# Patient Record
Sex: FEMALE | Race: ASIAN | NOT HISPANIC OR LATINO
[De-identification: names, ages, dates, MRNs, and addresses within clinical notes are randomized per-mention and may not be internally consistent; named-entity substitution may affect disease eponyms.]

---

## 2022-04-05 PROBLEM — Z00.00 ENCOUNTER FOR PREVENTIVE HEALTH EXAMINATION: Status: ACTIVE | Noted: 2022-04-05

## 2022-04-11 ENCOUNTER — APPOINTMENT (OUTPATIENT)
Dept: ALLERGY | Facility: CLINIC | Age: 57
End: 2022-04-11
Payer: COMMERCIAL

## 2022-04-11 ENCOUNTER — NON-APPOINTMENT (OUTPATIENT)
Age: 57
End: 2022-04-11

## 2022-04-11 VITALS
OXYGEN SATURATION: 98 % | TEMPERATURE: 98.1 F | RESPIRATION RATE: 15 BRPM | SYSTOLIC BLOOD PRESSURE: 115 MMHG | DIASTOLIC BLOOD PRESSURE: 65 MMHG | HEART RATE: 64 BPM

## 2022-04-11 PROCEDURE — 99204 OFFICE O/P NEW MOD 45 MIN: CPT | Mod: 25

## 2022-04-11 PROCEDURE — 95004 PERQ TESTS W/ALRGNC XTRCS: CPT

## 2022-04-11 PROCEDURE — 95018 ALL TSTG PERQ&IQ DRUGS/BIOL: CPT

## 2022-04-11 RX ORDER — COVID-19 MOLECULAR TEST ASSAY
KIT MISCELLANEOUS
Qty: 1 | Refills: 0 | Status: DISCONTINUED | COMMUNITY
Start: 2022-04-05

## 2022-04-11 RX ORDER — LEVOTHYROXINE SODIUM 112 UG/1
112 TABLET ORAL
Qty: 90 | Refills: 0 | Status: ACTIVE | COMMUNITY
Start: 2021-09-22

## 2022-04-11 NOTE — PHYSICAL EXAM
[Alert] : alert [Well Nourished] : well nourished [No Acute Distress] : no acute distress [Well Developed] : well developed [Normal Voice/Communication] : normal voice communication [No Neck Mass] : no neck mass was observed [No LAD] : no lymphadenopathy [No Thyroid Mass] : no thyroid mass [Supple] : the neck was supple [Normal Rate and Effort] : normal respiratory rhythm and effort [No Crackles] : no crackles [No Retractions] : no retractions [Wheezing] : no wheezing was heard [Normal Rate] : heart rate was normal  [Normal S1, S2] : normal S1 and S2 [No murmur] : no murmur [Regular Rhythm] : with a regular rhythm [Normal Cervical Lymph Nodes] : cervical [Skin Intact] : skin intact  [No Rash] : no rash [No Cyanosis] : no cyanosis [Normal Mood] : mood was normal [Normal Affect] : affect was normal [Judgment and Insight Age Appropriate] : judgement and insight is age appropriate [Alert, Awake, Oriented as Age-Appropriate] : alert, awake, oriented as age appropriate

## 2022-04-11 NOTE — SOCIAL HISTORY
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

## 2022-04-11 NOTE — ASSESSMENT
[FreeTextEntry1] : Headaches with no underlying food allergies - the foods she mentioned may be more of a food intolerance causing her symptoms. \par \par History of seasonal allergic rhinitis - not problematic at this time\par \par Possible urticaria - patient to send me photo of rash for any recurrence

## 2022-04-11 NOTE — HISTORY OF PRESENT ILLNESS
[Asthma] : asthma [Eczematous rashes] : eczematous rashes [Venom Reactions] : venom reactions [de-identified] : Worsening H/As - saw PCP - patient was concerned about possible food allergies - blood work - told she was allergic to egg white, cow milk and hazelnuts - she feels that her H/A may worsen her headache.\par \par History of seasonal allergies during the spring time - but symptoms have lessened. \par \par Patient born in [facility].   \par \par She reports a random sporadic rash - no symptoms now for the past few months

## 2024-05-08 ENCOUNTER — EMERGENCY (EMERGENCY)
Facility: HOSPITAL | Age: 59
LOS: 1 days | Discharge: HOME | End: 2024-05-08
Attending: EMERGENCY MEDICINE | Admitting: EMERGENCY MEDICINE
Payer: COMMERCIAL

## 2024-05-08 VITALS
TEMPERATURE: 98 F | SYSTOLIC BLOOD PRESSURE: 109 MMHG | RESPIRATION RATE: 17 BRPM | DIASTOLIC BLOOD PRESSURE: 74 MMHG | OXYGEN SATURATION: 100 % | HEART RATE: 62 BPM

## 2024-05-08 VITALS
HEART RATE: 69 BPM | TEMPERATURE: 98 F | DIASTOLIC BLOOD PRESSURE: 72 MMHG | OXYGEN SATURATION: 99 % | RESPIRATION RATE: 16 BRPM | SYSTOLIC BLOOD PRESSURE: 112 MMHG

## 2024-05-08 LAB
ALBUMIN SERPL ELPH-MCNC: 4.4 G/DL — SIGNIFICANT CHANGE UP (ref 3.3–5)
ALP SERPL-CCNC: 78 U/L — SIGNIFICANT CHANGE UP (ref 40–120)
ALT FLD-CCNC: 19 U/L — SIGNIFICANT CHANGE UP (ref 4–33)
ANION GAP SERPL CALC-SCNC: 10 MMOL/L — SIGNIFICANT CHANGE UP (ref 7–14)
AST SERPL-CCNC: 18 U/L — SIGNIFICANT CHANGE UP (ref 4–32)
BASOPHILS # BLD AUTO: 0.05 K/UL — SIGNIFICANT CHANGE UP (ref 0–0.2)
BASOPHILS NFR BLD AUTO: 0.7 % — SIGNIFICANT CHANGE UP (ref 0–2)
BILIRUB SERPL-MCNC: <0.2 MG/DL — SIGNIFICANT CHANGE UP (ref 0.2–1.2)
BUN SERPL-MCNC: 16 MG/DL — SIGNIFICANT CHANGE UP (ref 7–23)
CALCIUM SERPL-MCNC: 9.2 MG/DL — SIGNIFICANT CHANGE UP (ref 8.4–10.5)
CHLORIDE SERPL-SCNC: 104 MMOL/L — SIGNIFICANT CHANGE UP (ref 98–107)
CO2 SERPL-SCNC: 26 MMOL/L — SIGNIFICANT CHANGE UP (ref 22–31)
CREAT SERPL-MCNC: 0.67 MG/DL — SIGNIFICANT CHANGE UP (ref 0.5–1.3)
D DIMER BLD IA.RAPID-MCNC: 731 NG/ML DDU — HIGH
EGFR: 102 ML/MIN/1.73M2 — SIGNIFICANT CHANGE UP
EOSINOPHIL # BLD AUTO: 0.17 K/UL — SIGNIFICANT CHANGE UP (ref 0–0.5)
EOSINOPHIL NFR BLD AUTO: 2.5 % — SIGNIFICANT CHANGE UP (ref 0–6)
GLUCOSE SERPL-MCNC: 93 MG/DL — SIGNIFICANT CHANGE UP (ref 70–99)
HCT VFR BLD CALC: 39.6 % — SIGNIFICANT CHANGE UP (ref 34.5–45)
HGB BLD-MCNC: 12.8 G/DL — SIGNIFICANT CHANGE UP (ref 11.5–15.5)
IANC: 3.96 K/UL — SIGNIFICANT CHANGE UP (ref 1.8–7.4)
IMM GRANULOCYTES NFR BLD AUTO: 0.3 % — SIGNIFICANT CHANGE UP (ref 0–0.9)
LYMPHOCYTES # BLD AUTO: 1.99 K/UL — SIGNIFICANT CHANGE UP (ref 1–3.3)
LYMPHOCYTES # BLD AUTO: 29.1 % — SIGNIFICANT CHANGE UP (ref 13–44)
MAGNESIUM SERPL-MCNC: 2.1 MG/DL — SIGNIFICANT CHANGE UP (ref 1.6–2.6)
MCHC RBC-ENTMCNC: 30 PG — SIGNIFICANT CHANGE UP (ref 27–34)
MCHC RBC-ENTMCNC: 32.3 GM/DL — SIGNIFICANT CHANGE UP (ref 32–36)
MCV RBC AUTO: 92.7 FL — SIGNIFICANT CHANGE UP (ref 80–100)
MONOCYTES # BLD AUTO: 0.65 K/UL — SIGNIFICANT CHANGE UP (ref 0–0.9)
MONOCYTES NFR BLD AUTO: 9.5 % — SIGNIFICANT CHANGE UP (ref 2–14)
NEUTROPHILS # BLD AUTO: 3.96 K/UL — SIGNIFICANT CHANGE UP (ref 1.8–7.4)
NEUTROPHILS NFR BLD AUTO: 57.9 % — SIGNIFICANT CHANGE UP (ref 43–77)
NRBC # BLD: 0 /100 WBCS — SIGNIFICANT CHANGE UP (ref 0–0)
NRBC # FLD: 0 K/UL — SIGNIFICANT CHANGE UP (ref 0–0)
PHOSPHATE SERPL-MCNC: 3.9 MG/DL — SIGNIFICANT CHANGE UP (ref 2.5–4.5)
PLATELET # BLD AUTO: 289 K/UL — SIGNIFICANT CHANGE UP (ref 150–400)
POTASSIUM SERPL-MCNC: 4.3 MMOL/L — SIGNIFICANT CHANGE UP (ref 3.5–5.3)
POTASSIUM SERPL-SCNC: 4.3 MMOL/L — SIGNIFICANT CHANGE UP (ref 3.5–5.3)
PROT SERPL-MCNC: 7.1 G/DL — SIGNIFICANT CHANGE UP (ref 6–8.3)
RBC # BLD: 4.27 M/UL — SIGNIFICANT CHANGE UP (ref 3.8–5.2)
RBC # FLD: 13.3 % — SIGNIFICANT CHANGE UP (ref 10.3–14.5)
SODIUM SERPL-SCNC: 140 MMOL/L — SIGNIFICANT CHANGE UP (ref 135–145)
TROPONIN T, HIGH SENSITIVITY RESULT: <6 NG/L — SIGNIFICANT CHANGE UP
WBC # BLD: 6.84 K/UL — SIGNIFICANT CHANGE UP (ref 3.8–10.5)
WBC # FLD AUTO: 6.84 K/UL — SIGNIFICANT CHANGE UP (ref 3.8–10.5)

## 2024-05-08 PROCEDURE — 93010 ELECTROCARDIOGRAM REPORT: CPT

## 2024-05-08 PROCEDURE — 71046 X-RAY EXAM CHEST 2 VIEWS: CPT | Mod: 26

## 2024-05-08 PROCEDURE — 99285 EMERGENCY DEPT VISIT HI MDM: CPT

## 2024-05-08 PROCEDURE — 93971 EXTREMITY STUDY: CPT | Mod: 26,LT

## 2024-05-08 PROCEDURE — 71275 CT ANGIOGRAPHY CHEST: CPT | Mod: 26,MC

## 2024-05-08 NOTE — ED PROVIDER NOTE - PATIENT PORTAL LINK FT
You can access the FollowMyHealth Patient Portal offered by [facility] by registering at the following website: [email or web address]. By joining [facility]’s FollowMyHealth portal, you will also be able to view your health information using other applications (apps) compatible with our system.

## 2024-05-08 NOTE — ED ADULT NURSE NOTE - CHIEF COMPLAINT QUOTE
states" I am having tingling in my fingers and feet since few weeks and I feel tingling in my left face 10 am today. states she woke up at 830 in the morning and was normal. c/o numbness to both feet and numbness to hands with pain. Dr Zabala called for stroke eval. no code stroke called

## 2024-05-08 NOTE — ED PROVIDER NOTE - PHYSICAL EXAMINATION
CONSTITUTIONAL: Well appearing, awake, alert, and in no apparent distress.  HEAD: normocephalic, atraumatic  ENT: oral mucosa moist  CARDIAC: regular rate and rhythm; no murmurs, rubs, or gallops  RESPIRATORY: normal breath sounds, clear to ascultation bilaterally, no rales, rhonchi, wheezing  GASTROINTESTINAL: abdomen nondistended, soft, nontender, no guarding, rebound tenderness  MSK: Spine appears normal, range of motion is not limited, no muscle or joint tenderness; no LE edema; calf circumference symmetric BL;   NEURO: Alert and oriented, no focal deficits, no motor or sensory deficits. CN2-12 intact, 5/5 strength x 4, symmetric sensation throughout, normal finger to nose BL, normal gait  SKIN: Skin normal color for race, warm, dry and intact. No evidence of rash.  PSYCH: appropriate mood and affect

## 2024-05-08 NOTE — ED ADULT NURSE NOTE - OBJECTIVE STATEMENT
Patient received in intake room 5. Patient A&Ox3 and ambulatory at baseline. Patient presents to the ED c/o bilateral numbness to upper extremities. Patient denies significant phx; family history of pulmonary embolism (patient's mother). Patient states for approximately 3 weeks, she has been experiencing bilateral upper extremity paraesthesia. Pulse/motor/sensory present to all four extremities. Patient denies headache, dizziness, lightheadedness, nausea/vomiting, fever/chills, and pain. Patient offers no complaints at this time. Respirations even and unlabored, no signs/symptoms of acute distress; patient denies dyspnea, shortness of breath, and chest pain; patient able to speak in complete and uninterrupted sentences. 20G IV placed to right AC, labs collected and sent, patient is stable at this time. Steady gait observed.

## 2024-05-08 NOTE — ED PROVIDER NOTE - CARE PLAN
1 Principal Discharge DX:	Paresthesia  Secondary Diagnosis:	Chest discomfort   Finasteride Pregnancy And Lactation Text: This medication is absolutely contraindicated during pregnancy. It is unknown if it is excreted in breast milk.

## 2024-05-08 NOTE — ED PROVIDER NOTE - NSFOLLOWUPINSTRUCTIONS_ED_ALL_ED_FT
You were seen in the Emergency Department for tingling.  Your evaluation today shows your symptoms are not from any dangerous or life-threatening causes.  We recommend you follow up with your PCP within the week.      1) Continue all previously prescribed medications as directed.    2) Follow up with your primary care physician - take copies of your results.    3) Return to the Emergency Department for worsening or persistent symptoms, and/or ANY NEW OR CONCERNING SYMPTOMS.

## 2024-05-08 NOTE — ED PROVIDER NOTE - PROGRESS NOTE DETAILS
Juve PGY1: d-dimer 731.  Will order CTa Juve PGY1: CTa  negative for PE.  Patient updated on results, labs and imaging unremarkable.  Discussed plan for discharge with follow up to pcp.  patient understands and agrees.

## 2024-05-08 NOTE — ED PROVIDER NOTE - OBJECTIVE STATEMENT
58-year-old female, history of hypothyroidism, presented to ED with 2 weeks of intermittent tingling sensation to both her fingers and toes.  No exacerbating relieving factors.  This morning noted tingling sensation to the left side of her face which brought her into the ED today.  No focal weakness, slurred speech.  Patient also reporting midsternal chest heaviness has been intermittent for the past 2 weeks.  Feels discomfort is sometimes associated with eating.  Saw her PCP 2 weeks ago, had normal lab work and EKG, referred for outpatient CTA which has not been done yet.  No shortness of breath.  Patient reports recent drive from [place] 3 days ago in addition to intermittent right calf discomfort for the past few days.  Denies LE edema.  No hx smoking, CA malignancy, hormone use.

## 2024-05-08 NOTE — ED PROVIDER NOTE - CLINICAL SUMMARY MEDICAL DECISION MAKING FREE TEXT BOX
58-year-old female, history of hypothyroidism, presented to ED with 2 weeks of intermittent tingling sensation to both her fingers and toes.  No exacerbating relieving factors.  This morning noted tingling sensation to the left side of her face which brought her into the ED today.  No focal weakness, slurred speech.  Patient also reporting midsternal chest heaviness has been intermittent for the past 2 weeks.  Feels discomfort is somtimes associated with eating.  Saw her PCP 2 weeks ago, had normal lab work and EKG, referred for outpatient CTA which has not been done yet.  No shortness of breath.  Patient reports recent drive from [place] 3 days ago in addition to intermittent right calf discomfort for the past few days.  Denies LE edema.  No hx smoking, CA malignancy, hormone use.  VSS.  Patient is well appearing, in no acute distress, heart regular rate and rhythm, lungs clear, abdomen soft and non-tender, no LE edema/tenderness BL, CN2-12 intact, 5/5 strength x 4, symmetric sensation throughout, normal finger to nose BL, normal gait.  Will assess for electrolyte abnormality, DVT/PE, ACS.  Will check basic labs, troponin, magnesium, phosphorus, chest xray, ecg, d-dimer, US LE.

## 2024-05-08 NOTE — ED PROVIDER NOTE - ATTENDING CONTRIBUTION TO CARE
Agree with resident note  58-year-old female with history of hypothyroidism presents to the emergency room for 2 weeks of tingling in hands and feet.  Patient states has mild chest pain associated with this.  Per history her PCP had put her in for a CT angiogram of the chest given symptoms.  States insurance approved today.  Scheduled to have it upcoming.  Denies any shortness of breath.  Denies any other neurological symptoms.  Does endorse right leg discomfort.  Denies any history of PEs or DVTs.  Physical exam  Well-appearing female in no respiratory distress  Vital signs stable  Vascular equal pulses bilaterally  Clear to auscultation bilaterally  S1-S2 no murmurs rubs or gallops  Abdomen soft nontender nondistended  Neuro cranial nerves intact, 5/5 motor upper and lower extremity, sensation intact  Impression  Patient has had ongoing symptoms for weeks, exam more consistent with neuropathy, scheduled for CT as outpatient will get D-dimer if elevated will proceed with CTA here in the emergency room to rule out dissection, given right leg pain will get Doppler to rule out DVT  Will reassess after all of the above are complete

## 2024-05-08 NOTE — ED ADULT TRIAGE NOTE - CHIEF COMPLAINT QUOTE
states" I am having tingling in my fingers and feet since few weeks and I feel tingling in my left face 10 am today. states she woke up at 830 in the morning and was normal. c/o numbness to both feet and numbness to hands with pain. states" I am having tingling in my fingers and feet since few weeks and I feel tingling in my left face 10 am today. states she woke up at 830 in the morning and was normal. c/o numbness to both feet and numbness to hands with pain. Dr Zabala called for stroke eval. no code stroke called